# Patient Record
(demographics unavailable — no encounter records)

---

## 2018-04-26 NOTE — UC
Throat Pain/Nasal Jorge HPI





- HPI Summary


HPI Summary: 





Patient is a 13-year-old male presenting to the  with his father with a chief 

complaint of sore throat since yesterday.  He states the sore throat has been 

improving.  He has not taken any ibuprofen or Tylenol for relief.  Has not used 

any other over-the-counter medications for relief.  Denies fevers, sweats, 

chills.  History of strep throat 1 many years ago.  Denies any ear pressure or 

pain.  He denies any other complaints at this time.  Immunizations are up-to-

date.





- History of Current Complaint


Chief Complaint: UCGeneralIllness


Stated Complaint: SORE THROAT


Time Seen by Provider: 04/19/18 20:17


Onset/Duration: Sudden Onset


Severity: Mild


Pain Intensity: 2


Pain Scale Used: 0-10 Numeric


Associated Signs & Symptoms: Positive: Dysphagia, Hoarseness





- Epiglottits Risk Factors


Epiglottis Risk Factors: Negative





- Allergies/Home Medications


Allergies/Adverse Reactions: 


 Allergies











Allergy/AdvReac Type Severity Reaction Status Date / Time


 


No Known Allergies Allergy   Verified 04/19/18 20:15














PMH/Surg Hx/FS Hx/Imm Hx


Previously Healthy: Yes





- Surgical History


Surgical History: Yes


Surgery Procedure, Year, and Place: HERNIA REPAIR





- Family History


Known Family History: Positive: Unknown





- Social History


Occupation: Unemployed


Lives: With Family


Alcohol Use: None


Substance Use Type: None


Smoking Status (MU): Never Smoked Tobacco





- Immunization History


Vaccination Up to Date: Yes





Review of Systems


Constitutional: Negative


Skin: Negative


Eyes: Negative


ENT: Sore Throat


Respiratory: Negative


Cardiovascular: Negative


Neurovascular: Negative


Musculoskeletal: Negative


Neurological: Negative


Is Patient Immunocompromised?: No


All Other Systems Reviewed And Are Negative: Yes





Physical Exam


Triage Information Reviewed: Yes


Appearance: Well-Appearing, Well-Nourished


Vital Signs: 


 Initial Vital Signs











Temp  98.0 F   04/19/18 20:08


 


Pulse  75   04/19/18 20:08


 


Resp  16   04/19/18 20:08


 


BP  121/67   04/19/18 20:08


 


Pulse Ox  100   04/19/18 20:08











Vital Signs Reviewed: Yes


Eye Exam: Normal


Eyes: Positive: Conjunctiva Clear


Dental Exam: Normal


Respiratory Exam: Normal


Respiratory: Positive: Chest non-tender, Lungs clear


Musculoskeletal Exam: Normal


Musculoskeletal: Positive: Strength Intact


Neurological Exam: Normal


Neurological: Positive: Alert


Skin Exam: Normal





Throat Pain/Nasal Course/Dx





- Course


Course Of Treatment: Strep test negative.  Patient's encouraged Cepacol tabs 

and Tylenol for relief.





- Differential Dx/Diagnosis


Provider Diagnoses: Sore Throat





Discharge





- Sign-Out/Discharge


Documenting (check all that apply): Discharge/Admit/Transfer





- Discharge Plan


Condition: Stable


Disposition: HOME


Referrals: 


Yanelis Merlos NP [Primary Care Provider] - 


Additional Instructions: 


Cepacol tabs for any worsening pain





- Billing Disposition and Condition


Condition: STABLE


Disposition: HOME

## 2018-07-19 NOTE — ED
Skin Complaint





- HPI Summary


HPI Summary: 


Pt here w/ laceration to scalp last night at 2300. Accidentally struck the top 

of his head on a cabinet when transitioning from sitting/leaning to standing. 

Minimal bleeding that stopped on its own w/o pressure. Father cleaned w/ H202 

and applied triple antibiotic ointment last night. No bleeding today but they 

wanted to come in for wound check as he's traveling to [place] Saturday for camp. Pt 

admits to focal soreness of affectd area but no LOC, HA, visual change, 

photophobia, nausea, vomiting, memory lapse, numbness, tingling, weakness, 

lethargy. Has not had any vaccines as family has declined or Mosque reasons. 





- History of Current Complaint


Chief Complaint: EDLacSutureRecheck


Time Seen by Provider: 07/19/18 08:47


Stated Complaint: HEAD LAC


Hx Obtained From: Patient, Family/Caretaker - father


Pain Intensity: 0





- Allergy/Home Medications


Allergies/Adverse Reactions: 


 Allergies











Allergy/AdvReac Type Severity Reaction Status Date / Time


 


No Known Allergies Allergy   Verified 07/19/18 09:11














PMH/Surg Hx/FS Hx/Imm Hx


Previously Healthy: Yes


Endocrine/Hematology History: 


   Denies: Hx Anticoagulant Therapy, Hx Blood Disorders, Hx Diabetes, Hx 

Thyroid Disease, Hx Coagulopothy, Autoimmune Disease


Cardiovascular History: 


   Denies: Hx Hypertension


Respiratory History: 


   Denies: Hx Asthma, Hx Chronic Obstructive Pulmonary Disease (COPD)


GI History: 


   Denies: Hx Ulcer





- Surgical History


Surgery Procedure, Year, and Place: HERNIA REPAIR





- Immunization History


Immunizations Up to Date: No - has declined d/t Mosque reasons


Infectious Disease History: No


Infectious Disease History: 


   Denies: Hx Hepatitis, Hx Human Immunodeficiency Virus (HIV), Hx of Known/

Suspected MRSA, Traveled Outside the [place] in Last 30 Days





- Family History


Known Family History: Positive: Unknown





- Social History


Occupation: Student


Lives: With Family


Alcohol Use: None


Hx Substance Use: No


Substance Use Type: Reports: None


Hx Tobacco Use: No


Smoking Status (MU): Never Smoked Tobacco





Review of Systems


Constitutional: Negative


Eyes: Negative


ENT: Negative


Gastrointestinal: Negative


Positive: no symptoms reported


Musculoskeletal: Negative


Skin: Other - laceration


Neurological: Negative


Psychological: Normal


All Other Systems Reviewed And Are Negative: Yes





Physical Exam


Triage Information Reviewed: Yes


Vital Signs On Initial Exam: 


 Initial Vitals











Temp Pulse Resp BP Pulse Ox


 


 97.8 F   64   16   117/80   99 


 


 07/19/18 08:42  07/19/18 08:42  07/19/18 08:42  07/19/18 08:42  07/19/18 08:42











Vital Signs Reviewed: Yes


Appearance: Positive: Well-Appearing, No Pain Distress, Well-Nourished


Skin: Positive: Warm, Skin Color Reflects Adequate Perfusion, Dry - sealed 

abrasion over Lt parietal scalp - no active bleeding despite manipulation and 

wound is closed - no edema, no ecchymosis - mild TTP


Head/Face: Positive: Normal Head/Face Inspection - no step off, no crepitus, no 

gross deformity


Eyes: Positive: Normal, EOMI, TASH


ENT: Positive: Normal ENT inspection, Hearing grossly normal, Pharynx normal


Dental: Negative: Dental Fracture @


Neck: Positive: Supple, Nontender


Respiratory/Lung Sounds: Positive: Breath Sounds Present


Cardiovascular: Positive: Normal


Musculoskeletal: Positive: Normal, Strength/ROM Intact


Neurological: Positive: Normal, Sensory/Motor Intact, Alert, Oriented to Person 

Place, Time, CN Intact II-III


Psychiatric: Positive: Normal





Diagnostics





- Vital Signs


 Vital Signs











  Temp Pulse Resp BP Pulse Ox


 


 07/19/18 08:42  97.8 F  64  16  117/80  99














- Laboratory


Lab Statement: Any lab studies that have been ordered have been reviewed, and 

results considered in the medical decision making process.





Course/Dx





- Course


Course Of Treatment: Patient's head wound appears to be closing well.  No 

further intervention is advised.  Encouraged continued washing with triple 

antibiotic application and monitoring for signs or symptoms of infection.  Also 

had conversation with patient and father about updating tetanus noting that if 

he wanted this today he would receive an immunoglobulin as well as the tetanus 

vaccine.  Father declines and is aware that tetanus can cause muscle rigidity 

in it's worse forearm leading to diaphragm contraction and death.  Communicated 

symptoms to patient as well as he will be out of town and encouraged to seek 

medical attention immediately for early signs of possible tetanus onset.  

Patient agrees with plan.





- Diagnoses


Provider Diagnoses: 


 Abrasion head








Discharge





- Sign-Out/Discharge


Documenting (check all that apply): Patient Departure





- Discharge Plan


Condition: Stable


Disposition: HOME


Patient Education Materials:  Head Injury (ED), Abrasion (ED)


Referrals: 


Hector Kingston III NP [Primary Care Provider] - 


Additional Instructions: 


Gently wash wound with soap and water, rinse well and pat dry with clean cloth.

  Reapply triple antibiotic ointment.  Continue this daily until sab forms. For 

pain or swelling, you may apply ice and take ibuprofen with food. 





* If you develop redness, swelling, streaking, purulent drainage, fevers or 

chills, seek medical attention sooner or return to the emergency department.


*If you develop vomiting, headache, visual change, dizziness, numbness, tingling

, weakness, seek medical attention


*If you develop muscle soreness or stiffness, seek medical attention 

immediately and notify medical staff of recent injury in the face of lack of 

tetanus vaccine





- Billing Disposition and Condition


Condition: STABLE


Disposition: Home